# Patient Record
(demographics unavailable — no encounter records)

---

## 2024-10-15 NOTE — HISTORY OF PRESENT ILLNESS
[FreeTextEntry1] : Patient returns after being seen in July.  He did have a series of 3 LESIs which helped him.  He states that his pain has returned after going to PT.  He is here to discuss a repeat injection.

## 2024-10-15 NOTE — ASSESSMENT
[FreeTextEntry1] : 59 year old male with low back pain and lumbar radicular pain now returned.  Given the nature of the patient's complaints and lack of significant improvement following more conservative measures, we did discuss lumbar epidural steroid injection.  Risks, benefits, and expectations of the procedure were reviewed including but not limited to bleeding, infection, and nerve damage.  The patient was provided with an educational pamphlet outlining the details of the procedure so that he/she may have the ability to review the information prior to proceeding.  The patient has agreed to proceed and will follow up with me for the procedure.

## 2024-10-21 NOTE — ASSESSMENT
[Indicate if, in your opinion, the incident that the patient described was the competent medical cause of this injury/illness.] : The incident that the patient described was the competent medical cause of this injury/illness: Yes [Indicate if the patient's complaints are consistent with his/her history of the injury/illness.] : Indicate if the patient's complaints are consistent with his/her history of the injury/illness: Yes [Yes] : Yes, it is consistent [Can the patient return to usual work activities as indicated? If yes, indicate date___] : The patient cannot return to usual work activities as indicated. [FreeTextEntry1] : 60 yo m with work-related injury of the lumbar spine as a result of lifting heavy item. MRI provides objective evidence of disc injury. LESI has provided some improvement but short-lived. He will continue to f/u with Dr. Lang. Is Pt is asking about the value of surgical consultation. Even though he has not exhausted conservative options he will ,sooner rather than later, since neither PT nor LESI have thus far resulted in offering sustained benefit after nearly 18m. A role for surgery, at this stage, premature, but such a consideration may be appropriate if conservative measures continue to confer no benefit.  F/u with Dr. Lang, PMR, for review of MRI and eval of clinical status and response to PT. Meantime, he should continue with anti-inflammatory and muscle relaxants. Encourage pt to adhere to this regimen and to manage s/e: take naproxen with food; methocarbamol qhs if daytime sedation was the issue. Encouraged heat to lumbar area, gentle activities, avoidance of back strain/exertion. Focused PT will likely confer benefit but defer to Dr. Beyer. No red flags. Encouraged diet/weight loss efforts.  Pain is severe and debilitating and at nearly 12 months later, increasingly less likely for a full recovery. Some possible benefit may be seen with subsequent LESI. Pt unable to get on to exam table for PE; exam in chair.  It is not medically appropriate for him to RTW without lifting, moving, sitting restrictions and no "light' duty options are said to be available.   Encouraged continued f/u with PMR/pain mgmt, pd as well diet, exercise (as tolerated) [FreeTextEntry2] : temporally related onset of pain following intense exertion [FreeTextEntry3] : Classic findings for lifting-related injury [FreeTextEntry4] : PE findings c/w nature of injury [FreeTextEntry5] : 100 [FreeTextEntry6] : Functionally, ROM are severely limited, in significant pain,  [FreeTextEntry7] : Restricitions would have to include: No bending, reaching, pushing, pulling, twisting, no carrying, no climbing No lifting >5 pounds No standing more than 15 minutes, no sitting more than 15 minutes

## 2024-10-21 NOTE — HISTORY OF PRESENT ILLNESS
[FreeTextEntry1] : 60 yo m PMH HTN, Obesity, ANNMARIE here for routine f/u of LBP while exerting at work. In the interval since our last visit, pt remains clinically worsened: while he continues to have severe LBP associated with lifting, movement, ADLs, laying down, standing and static postures, generally, for the past few months has begun having pain radiate down his posterior legs to the knees, in association with certain movements a/o exertions. He reports no paresthesia's in his extremities, bladder/bowel issues, and other red flags. He was earlier this month by PM&R and underwent yet another epidural steroid injection and since then, Mr. Cantu is experiencing some improvement in sxs. Following the last such injection, any improvement was quickly followed by a return to baseline pain and impairment within weeks. At today's visit her reports persistent pain even at rest and worsened by movements including ADLs. Pain ranges from 3 -9 out of 10. He reports that no true light duty options are available at work.  He reports that Dr. Lang is not sure of pt will experience marked improvement since neither PT nor injections are conferring sustained benefit. Dr. Lang is considering referral for surgical consultation.    To review:  Pt is a  at [employer] and on the day on injury was assisting in the lifting of an item weighing several hundred pounds during which he felt a sharp pain, 10/10, in his lower back described as a knife. Initially, pain radiated to his buttocks. He sought medical attention at urgent care and then at [facility] ED. He was given diagnosed with a soft tissue injury of the lumbar region and referred to outpatient PMR. Prescription naproxen, methocarbamol which he has been taking PRN. In the interval since the last visit, he underwent an MRI (outside [facility]) showing L1-L2 mild disc bulging and small herniation, L3-L4 small herniation and bulging in L4-L5 and L5-S1. . He reports lumbar herniation(s) identified on the imaging. He has not been to Dr. Beyer since the MRI was done. He now sees Dr. Desai in PMR. Sxs has not markedly improved from date of injury. He reports having started PT but that the PT flares his pain and has yet to experience any benefit from it. Denies foot weakness, sensory or motor deficits.  No prior hx of lumbar injuries.  Denies red flags s/sxs: no bowel/bladder dysfunction, saddle anesthesia, f/c, weight loss, night sweats  BP elevated--denies CP/SOB once again but lowered on subsequent checks during the visit. [FreeTextEntry2] :  [FreeTextEntry3] : All activity is problematic. Even resting. Static postures, as well, (sitting, standing, laying) [FreeTextEntry4] : PT, NSAIDs, muscle relaxants [FreeTextEntry5] : n/a [Has the patient missed work because of the injury/illness?] : The patient has missed work because of the injury/illness. [No] : The patient is currently not working. [FreeTextEntry6] : 5/20/23

## 2024-10-21 NOTE — PHYSICAL EXAM
[Neck Appearance] : the appearance of the neck was normal [Neck Cervical Mass (___cm)] : no neck mass was observed [Jugular Venous Distention Increased] : there was no jugular-venous distention [Thyroid Diffuse Enlargement] : the thyroid was not enlarged [Thyroid Nodule] : there were no palpable thyroid nodules [Full Pulse] : the pedal pulses are present [Edema] : there was no peripheral edema [Cervical Lymph Nodes Enlarged Posterior Bilaterally] : posterior cervical [Cervical Lymph Nodes Enlarged Anterior Bilaterally] : anterior cervical [Supraclavicular Lymph Nodes Enlarged Bilaterally] : supraclavicular [Axillary Lymph Nodes Enlarged Bilaterally] : axillary [Femoral Lymph Nodes Enlarged Bilaterally] : femoral [Inguinal Lymph Nodes Enlarged Bilaterally] : inguinal [Deep Tendon Reflexes (DTR)] : deep tendon reflexes were 2+ and symmetric [Sensation] : the sensory exam was normal to light touch and pinprick [No Focal Deficits] : no focal deficits [General Appearance - Alert] : alert [General Appearance - In No Acute Distress] : in no acute distress [Sclera] : the sclera and conjunctiva were normal [PERRL With Normal Accommodation] : pupils were equal in size, round, and reactive to light [Extraocular Movements] : extraocular movements were intact [Outer Ear] : the ears and nose were normal in appearance [Oropharynx] : the oropharynx was normal [] : no respiratory distress [Auscultation Breath Sounds / Voice Sounds] : lungs were clear to auscultation bilaterally [Heart Rate And Rhythm] : heart rate was normal and rhythm regular [Heart Sounds] : normal S1 and S2 [Heart Sounds Gallop] : no gallops [Murmurs] : no murmurs [Heart Sounds Pericardial Friction Rub] : no pericardial rub [FreeTextEntry1] : Limited ROM of back, neck secondary to pain, discomfort; no TTP of the lumbar area but some discomfort with deep palpation [Oriented To Time, Place, And Person] : oriented to person, place, and time [Impaired Insight] : insight and judgment were intact [Affect] : the affect was normal

## 2024-10-21 NOTE — PLAN
[FreeTextEntry1] :  Conservative tx with: anti-inflammatory and muscle relaxants. Encourage pt to adhere to this regimen and to manage s/e: take naproxen with food; methocarbamol qhs if daytime sedation was the issue. Encouraged heat to lumbar area, gentle activities, avoidance of back strain/exertion. Focused PT will likely confer benefit but defer to Dr. Beyer. \par  \par  No light/nonexertional duty is available at his job. It is not medically appropriate for him to RTW without restrictions. Therefore, he should remain out until he has improved clinically and functionally.  [FreeTextEntry2] : TBD [FreeTextEntry3] : cautiously optimistic [FreeTextEntry4] : 60 days

## 2024-11-18 NOTE — PROCEDURE
[de-identified] :   [facility] [address], [place], [address] - [phone]   PATIENT: DONNELL SUTHERLAND MEDICAL RECORD #: DATE OF OPERATION: 11/18/2024 PREOPERATIVE DIAGNOSIS:  LUMBAR RADICULAR PAIN POSTOPERATIVE DIAGNOSIS:  LUMBAR RADICULAR PAIN PROCEDURE: LUMBAR EPIDURAL STEROID INJECTION UNDER X-RAY GUIDANCE SURGEON:  KELY LANG M.D. ANESTHEISA:  LOCAL EBL:  MINIMAL   INDICATIONS:  The patient returns to Pain Management with persistent lower back pain with radiation down both legs.  The pain has remained quite significant and interferes with the patients ability to perform ADLs despite the implementation of other conservative measures.  I discussed with the patient at length the risks, benefits, and expectations of the aforementioned procedure.  All of the patients questions were answered.  The patient signed informed consent and agreed to proceed.   PROCEDURE:  The patient was transported to the operating room, placed in the prone position and monitored non-invasively with stable vital signs and no complaints.  The patients back was prepped three times with betadine and draped in meticulous sterile fashion.  The L3-L4 interspace was identified fluoroscopically and the skin overlying this level was infiltrated with 5cc of 1% preservative-free lidocaine using a 25 gauge one inch needle.  The epidural space was approached and entered at this level with a 20 gauge Tuohy needle by loss of resistance technique.   Following loss of resistance to air, aspiration was negative for CSF or heme.  Then, 2cc of Omnipaque 240 were injected and the epidurogram revealed spread from L3 to L5 in the posterior epidural space bilaterally with no distinct cutoff.  Depo-Medrol 80mg was then injected with 1cc of preservative-free 1% lidocaine.  The needle tract was flushed with 2cc of 1% lidocaine and the needle was removed.  A sterile bandage was applied.   The patient tolerated the procedure well without complaint or complication.  The patient was observed in stable condition after the procedure for approximately 30 minutes before being discharged to home in the company of an adult.  The patient was provided with full written instructions.  The patient will be seen for follow-up in my office in 1 week but certainly sooner with any questions or concerns.       Kely Lang M.D.

## 2024-12-10 NOTE — ASSESSMENT
[FreeTextEntry1] : 59 year old male with low back pain and lumbar radicular pain now moderately improved following his most recent LESI.  He will continue with PT and return to see me at the earliest sign that his pain returns for further injection therapy as necessary.

## 2024-12-10 NOTE — HISTORY OF PRESENT ILLNESS
[FreeTextEntry1] : Patient returns after the most recent LESI a few weeks ago.  The patient reports 50-60% improvement in the symptoms.  The patient is pleased with the results and returns for a follow up visit today.

## 2025-01-08 NOTE — HISTORY OF PRESENT ILLNESS
[FreeTextEntry1] : 60 yo m PM HTN, Obesity, ANNMARIE here for routine f/u of LBP while exerting at work. In the interval since our last visit, pt remains clinically worsened: he continues to have severe LBP associated with lifting, movement, ADLs, laying down, standing and static postures, generally, for the past few months has begun having pain radiate down his posterior legs to the knees, in association with certain movements a/o exertions. He reports no paresthesia's in his extremities, bladder/bowel issues, and other red flags. He was seen by PM&R and underwent yet another epidural steroid injection and since then, Mr. Cantu is experiencing some improvement in sxs which lasted for about 3 weeks s/p injection followed by return of sxs. At today's visit he reports persistent pain even at rest and worsened by movements including ADLs. Denies: chills, fever, loss of bladder control, bowel incontinence or urinary retention, numbness/tingling or weakness. Pain ranges from 3 -9 out of 10.  He reports that no true light duty options are available at work. PT was stopped late fall after experiencing worsening of sxs during PT. Says he is considering returning in hopes of achieving some improvement in sxs.     To review:  Pt is a  at [employer] and on the day on injury was assisting in the lifting of an item weighing several hundred pounds during which he felt a sharp pain, 10/10, in his lower back described as a knife. Initially, pain radiated to his buttocks. He sought medical attention at urgent care and then at [facility] ED. He was given diagnosed with a soft tissue injury of the lumbar region and referred to outpatient PMR. Prescription naproxen, methocarbamol which he has been taking PRN. In the interval since the last visit, he underwent an MRI (outside [facility]) showing L1-L2 mild disc bulging and small herniation, L3-L4 small herniation and bulging in L4-L5 and L5-S1. . He reports lumbar herniation(s) identified on the imaging. He has not been to Dr. Beyer since the MRI was done. He now sees Dr. Desai in PMR. Sxs has not markedly improved from date of injury. He reports having started PT but that the PT flares his pain and has yet to experience any benefit from it. Denies foot weakness, sensory or motor deficits.  No prior hx of lumbar injuries.  Denies red flags s/sxs: no bowel/bladder dysfunction, saddle anesthesia, f/c, weight loss, night sweats  BP elevated--denies CP/SOB once again but lowered on subsequent checks during the visit. [FreeTextEntry2] :  [FreeTextEntry3] : All activity is problematic. Even resting. Static postures, as well, (sitting, standing, laying) [FreeTextEntry4] : PT, NSAIDs, muscle relaxants [FreeTextEntry5] : n/a [Has the patient missed work because of the injury/illness?] : The patient has missed work because of the injury/illness. [No] : The patient is currently not working. [FreeTextEntry6] : 5/20/23

## 2025-01-08 NOTE — ASSESSMENT
[Indicate if, in your opinion, the incident that the patient described was the competent medical cause of this injury/illness.] : The incident that the patient described was the competent medical cause of this injury/illness: Yes [Indicate if the patient's complaints are consistent with his/her history of the injury/illness.] : Indicate if the patient's complaints are consistent with his/her history of the injury/illness: Yes [Yes] : Yes, it is consistent [Can the patient return to usual work activities as indicated? If yes, indicate date___] : The patient cannot return to usual work activities as indicated. [FreeTextEntry1] : 58 yo m with work-related injury of the lumbar spine as a result of lifting heavy item. MRI provides objective evidence of disc injury. LESI has provided some improvement but short-lived. He will continue to f/u with Dr. Lang. Is Pt is asking about the value of surgical consultation which I think would be useful input, at this point. Even though he has not exhausted conservative options he will , sooner rather than later, since neither PT nor LESI have thus far resulted in offering sustained benefit after nearly 18m. A role for surgery, at this stage, premature, but such a consideration may be appropriate if conservative measures continue to confer no benefit.  F/u with Dr. Lang, PMR, for review of MRI and eval of clinical status and response to PT. Meantime, he should continue with anti-inflammatory and muscle relaxants. Encourage pt to adhere to this regimen and to manage s/e: take naproxen with food; methocarbamol qhs if daytime sedation was the issue. Encouraged heat to lumbar area, gentle activities, avoidance of back strain/exertion. Returning to Dr. Beyer may be of benefit for non-surgical interventions. Focused PT will likely confer benefit but defer to Dr. Lang and/or Kayleen. No red flags of myelopathy. Encouraged diet/weight loss efforts as well as gentle physical activity.  Pain is severe and debilitating and at nearly 18 months later, increasingly less likely for a full recovery. Some possible benefit may be seen with subsequent LESI, PT, etc. Pt unable to get on to exam table for PE; exam in chair.  It is not medically appropriate for him to RTW without lifting, moving, sitting restrictions and no "light' duty options are said to be available.   [FreeTextEntry2] : temporally related onset of pain following intense exertion [FreeTextEntry3] : Classic findings for lifting-related injury [FreeTextEntry4] : PE findings c/w nature of injury [FreeTextEntry5] : 100% [FreeTextEntry6] : Functionally, ROM are severely limited, in significant pain,  [FreeTextEntry7] : Restricitions would have to include: No bending, reaching, pushing, pulling, twisting, no carrying, no climbing No lifting >5 pounds No standing more than 15 minutes, no sitting more than 15 minutes

## 2025-01-08 NOTE — REVIEW OF SYSTEMS
[Feeling Poorly] : feeling poorly [Negative] : Heme/Lymph [FreeTextEntry9] : pain with movement  Normal vision: sees adequately in most situations; can see medication labels, newsprint

## 2025-02-25 NOTE — ASSESSMENT
[FreeTextEntry1] : 60 year old male with low back pain and lumbar radicular pain now returned.  We did discuss a repeat lumbar epidural steroid injection.  Risks, benefits, and expectations of the procedure were reviewed including but not limited to bleeding, infection, and nerve damage.  The patient was provided with an educational pamphlet outlining the details of the procedure so that he/she may have the ability to review the information prior to proceeding.  The patient has agreed to proceed and will follow up with me for the procedure.

## 2025-02-25 NOTE — HISTORY OF PRESENT ILLNESS
[FreeTextEntry1] : Patient returns after being seen in December.  He states last month, his pain started to return again.  His last injection was in November of 2024.  He is here to discuss a repeat injection.

## 2025-03-19 NOTE — HISTORY OF PRESENT ILLNESS
[Home] : at home, [unfilled] , at the time of the visit. [Medical Office: ([facility])___] : at the medical office located in  [Telehealth (audio & video)] : This visit was provided via telehealth using real-time 2-way audio visual technology. [Verbal consent obtained from patient] : the patient, [unfilled] [FreeTextEntry8] : 60 year old male with c/o gout  took prednisone 2 weeks ago , helped a little, it is both legs left toe he usually gets it in both legs and multiple joints  it is getting much better hes drinking alot of water  no fevers  this is normal than he has had it is very hard to walk   throbbing when he walks on it 90 percent better though  off hctz

## 2025-03-19 NOTE — ASSESSMENT
[FreeTextEntry1] : long discussion pt really needs a visit and labs  will give colchicine and then allopurnol would refer to rheum

## 2025-03-19 NOTE — HISTORY OF PRESENT ILLNESS
[Home] : at home, [unfilled] , at the time of the visit. [Medical Office: ([facility])___] : at the medical office located in  [Telehealth (audio & video)] : This visit was provided via telehealth using real-time 2-way audio visual technology. [Verbal consent obtained from patient] : the patient, [unfilled] [FreeTextEntry8] : 60 year old male with c/o gout  took prednisone 2 weeks ago , helped a little, it is both legs left toe he usually gets it in both legs and multiple joints  it is getting much better hes drinking alot of water  no fevers  this is normal than he has had it is very hard to walk   throbbing when he walks on it 90 percent better though  off hctz

## 2025-04-09 NOTE — HISTORY OF PRESENT ILLNESS
[FreeTextEntry1] : 59 yo m PMH HTN, obesity, ANNMARIE here for routine f/u of LBP while exerting at work. In the interval since our last visit, pt remains clinically worsened: he continues to have severe LBP associated with lifting, movement, ADLs, laying down, standing and static postures, generally, for the past few months has begun having pain radiate down his posterior legs to the knees, in association with certain movements a/o exertions. He was scheduled to see Dr. Lang for a steroid injection but it had to be rescheduled (for later this month or next month). . After his last injection n Jan, Mr. Cantu experienced some improvement in sxs which lasted for about 3 weeks until return of sxs. At today's visit he reports persistent pain even at rest and worsened by movements including ADLs including laying in bed/sleeping, walking, shopping, sitting, etc. Denies: chills, fever, loss of bladder control, bowel incontinence or urinary retention, numbness/tingling or weakness no paresthesia's in his extremities, or other red flags. Pain ranges from 3 -9 out of 10.  He reports that no true light duty options are available at work. PT was stopped late fall after experiencing worsening of sxs during PT. Says he is considering returning in hopes of achieving some improvement in sxs.     To review:  Pt is a  at [employer] and on the day on injury was assisting in the lifting of an item weighing several hundred pounds during which he felt a sharp pain, 10/10, in his lower back described as a knife. Initially, pain radiated to his buttocks. He sought medical attention at urgent care and then at [facility] ED. He was given diagnosed with a soft tissue injury of the lumbar region and referred to outpatient PMR. Prescription naproxen, methocarbamol which he has been taking PRN. In the interval since the last visit, he underwent an MRI (outside [facility]) showing L1-L2 mild disc bulging and small herniation, L3-L4 small herniation and bulging in L4-L5 and L5-S1. . He reports lumbar herniation(s) identified on the imaging. He has not been to Dr. Beyer since the MRI was done. He now sees Dr. Desai in PMR. Sxs has not markedly improved from date of injury. He reports having started PT but that the PT flares his pain and has yet to experience any benefit from it. Denies foot weakness, sensory or motor deficits.  No prior hx of lumbar injuries.  Denies red flags s/sxs: no bowel/bladder dysfunction, saddle anesthesia, f/c, weight loss, night sweats  BP elevated--denies CP/SOB once again but lowered on subsequent checks during the visit. [FreeTextEntry2] :  [FreeTextEntry3] : All activity is problematic. Even resting. Static postures, as well, (sitting, standing, laying) [FreeTextEntry4] : PT, NSAIDs, muscle relaxants [FreeTextEntry5] : n/a [Has the patient missed work because of the injury/illness?] : The patient has missed work because of the injury/illness. [No] : The patient is currently not working. [FreeTextEntry6] : 5/20/23

## 2025-04-09 NOTE — ASSESSMENT
[Indicate if, in your opinion, the incident that the patient described was the competent medical cause of this injury/illness.] : The incident that the patient described was the competent medical cause of this injury/illness: Yes [Indicate if the patient's complaints are consistent with his/her history of the injury/illness.] : Indicate if the patient's complaints are consistent with his/her history of the injury/illness: Yes [Yes] : Yes, it is consistent [Can the patient return to usual work activities as indicated? If yes, indicate date___] : The patient cannot return to usual work activities as indicated. [FreeTextEntry1] : 59 yo m with work-related injury of the lumbar spine as a result of lifting heavy item. MRI provides objective evidence of disc injury. LESI has provided some improvement but short-lived. He will continue to f/u with Dr. Lang. Is Pt is asking about the value of surgical consultation which I still think would be useful input, at this point. I will provide him some names of surgeons to see. Even though he has not exhausted conservative options he will, sooner rather than later, since neither PT nor LESI have thus far resulted in offering sustained benefit after nearly 18m. In fact, attempts at PT have only resulted in his sxs worsening. A role for surgery, at this stage, premature, but such a consideration may be appropriate if conservative measures continue to confer no benefit. I will provide him some names for consideration of surgical consulatation.  F/u with Dr. Lang, PMR, for review of MRI and eval of clinical status and response to PT. Meantime, he should continue with anti-inflammatory and muscle relaxants. Encourage pt to adhere to this regimen and to manage s/e: take naproxen with food; methocarbamol qhs if daytime sedation was the issue. Encouraged heat to lumbar area, gentle activities, avoidance of back strain/exertion. Returning to Dr. Beyer may be of benefit for non-surgical interventions. Focused PT will likely confer benefit but defer to Dr. Lang and/or Kayleen. No red flags of myelopathy. Encouraged diet/weight loss efforts as well as gentle physical activity.  Pain is severe and debilitating and at nearly 2 y later, increasingly less likely for a full recovery. Some possible benefit may be seen with subsequent LESI. Pt unable to get on to exam table for PE; exam in chair.  It is not medically appropriate for him to RTW without lifting, moving, sitting restrictions and no "light' duty options are said to be available.   [FreeTextEntry2] : temporally related onset of pain following intense exertion [FreeTextEntry3] : Classic findings for lifting-related injury [FreeTextEntry4] : PE findings c/w nature of injury [FreeTextEntry5] : 100% [FreeTextEntry6] : Functionally, ROM are severely limited, in significant pain,  [FreeTextEntry7] : Restricitions would have to include: No bending, reaching, pushing, pulling, twisting, no carrying, no climbing No lifting >5 pounds No standing more than 15 minutes, no sitting more than 15 minutes This document is complete and the patient is ready for discharge.

## 2025-07-09 NOTE — HISTORY OF PRESENT ILLNESS
[FreeTextEntry1] : 61 yo m PMH HTN, obesity, ANNMARIE here for routine f/u of LBP while exerting at work. In the interval since our last visit, pt remains clinically the same: he continues to have severe LBP associated with lifting, movement, ADLs, laying down, standing and static postures, generally. He continues having pain radiate down his posterior legs to the knees, in association with certain movements a/o exertions. He has not had any further back injections since our last visit. s. At today's visit he reports persistent pain even at rest and worsened by movements including ADLs including laying in bed/sleeping, walking, shopping, sitting, etc. Denies: chills, fever, loss of bladder control, bowel incontinence or urinary retention, numbness/tingling or weakness no paresthesia's in his extremities, or other red flags. Pain ranges from 3 -9 out of 10.  He reports that no true light duty options are available at work. PT was stopped late fall after experiencing worsening of sxs during PT. Says he does not believe there will a RTW.  To review:  Pt is a  at [employer] and on the day on injury was assisting in the lifting of an item weighing several hundred pounds during which he felt a sharp pain, 10/10, in his lower back described as a knife. Initially, pain radiated to his buttocks. He sought medical attention at urgent care and then at [facility] ED. He was given diagnosed with a soft tissue injury of the lumbar region and referred to outpatient PMR. Prescription naproxen, methocarbamol which he has been taking PRN. In the interval since the last visit, he underwent an MRI (outside Health system) showing L1-L2 mild disc bulging and small herniation, L3-L4 small herniation and bulging in L4-L5 and L5-S1. . He reports lumbar herniation(s) identified on the imaging. He has not been to Dr. Beyer since the MRI was done. He now sees Dr. Desai in PMR. Sxs has not markedly improved from date of injury. He reports having started PT but that the PT flares his pain and has yet to experience any benefit from it. Denies foot weakness, sensory or motor deficits.  No prior hx of lumbar injuries.  Denies red flags s/sxs: no bowel/bladder dysfunction, saddle anesthesia, f/c, weight loss, night sweats  BP elevated--denies CP/SOB once again but lowered on subsequent checks during the visit. [FreeTextEntry2] :  [FreeTextEntry3] : All activity is problematic. Even resting. Static postures, as well, (sitting, standing, laying) [FreeTextEntry4] : PT, NSAIDs, muscle relaxants [FreeTextEntry5] : n/a [Has the patient missed work because of the injury/illness?] : The patient has missed work because of the injury/illness. [No] : The patient is currently not working. [FreeTextEntry6] : 5/20/23

## 2025-07-09 NOTE — ASSESSMENT
[Indicate if, in your opinion, the incident that the patient described was the competent medical cause of this injury/illness.] : The incident that the patient described was the competent medical cause of this injury/illness: Yes [Indicate if the patient's complaints are consistent with his/her history of the injury/illness.] : Indicate if the patient's complaints are consistent with his/her history of the injury/illness: Yes [Yes] : Yes, it is consistent [Can the patient return to usual work activities as indicated? If yes, indicate date___] : The patient cannot return to usual work activities as indicated. [FreeTextEntry1] : 61 yo m with work-related injury of the lumbar spine as a result of lifting heavy item. MRI provides objective evidence of disc injury. I advised to seek eval from neuro and neurosurg. A role for surgery, at this stage, premature, but such a consideration may be appropriate if conservative measures continue to confer no benefit.  Pain is severe and debilitating more than 2 ys later, increasingly less likely for a full recovery. Some possible benefit may be seen with non-surgical interventions. Defer to neurosurg in re: surgical benefit Pt unable to get on to exam table for PE; exam in chair.  It is not medically appropriate for him to RTW without lifting, moving, sitting restrictions and no "light' duty options are said to be available.   [FreeTextEntry2] : temporally related onset of pain following intense exertion [FreeTextEntry3] : Classic findings for lifting-related injury [FreeTextEntry4] : PE findings c/w nature of injury [FreeTextEntry5] : 100% [FreeTextEntry6] : Functionally, ROM are severely limited, in significant pain,  [FreeTextEntry7] : Restricitions would have to include: No bending, reaching, pushing, pulling, twisting, no carrying, no climbing No lifting >5 pounds No standing more than 15 minutes, no sitting more than 15 minutes

## 2025-07-29 NOTE — HISTORY OF PRESENT ILLNESS
[FreeTextEntry1] : DONNELL SUTHERLAND is a 60 year old with HTN, HLD, obesity who presents todays for annual physical to establish care as a new PCP.  Cardiology Dr. Huber  This patient presents today for general medical exam and to follow up for any medical issues. They deny any new health problems or new family medical history. The patient denies heat/cold intolerance, weight gain or loss, changes in their hair pattern, alteration in sleep habits, or change in their mood patterns. They also deny joint pain, rash, change in vision, or alteration in their bowel patterns.  The patient presents for evaluation of high blood pressure. Patient is currently tolerating the current  antihypertensive regime and they deny headaches, stiff neck, visual changes, pedal Edema or PND. On Amlodipine 5mg, Irbesartan 300mg, HCTZ 25mg.  They also are here for follow-up of elevated cholesterol. Patient is currently tolerating medication and denies muscle pain, joint pain, back pain, tea colored urine ,nausea, vomiting, abdominal pain or diarrhea. The patient is trying to follow a low cholesterol diet.  Patient also presents today for follow-up of obesity. The patient states they have not lost significant weight since the last visit. Patient is currently obese and remains sedentary. The patient has not been compliant with medical follow-up instructions for weight-loss and exercise since the last visit.